# Patient Record
Sex: FEMALE | Race: WHITE | NOT HISPANIC OR LATINO | Employment: FULL TIME | URBAN - METROPOLITAN AREA
[De-identification: names, ages, dates, MRNs, and addresses within clinical notes are randomized per-mention and may not be internally consistent; named-entity substitution may affect disease eponyms.]

---

## 2019-06-24 ENCOUNTER — APPOINTMENT (OUTPATIENT)
Dept: GENERAL RADIOLOGY | Facility: HOSPITAL | Age: 34
End: 2019-06-24

## 2019-06-24 ENCOUNTER — HOSPITAL ENCOUNTER (EMERGENCY)
Facility: HOSPITAL | Age: 34
Discharge: HOME | End: 2019-06-24
Attending: EMERGENCY MEDICINE | Admitting: EMERGENCY MEDICINE

## 2019-06-24 VITALS
SYSTOLIC BLOOD PRESSURE: 125 MMHG | RESPIRATION RATE: 18 BRPM | WEIGHT: 140 LBS | TEMPERATURE: 97.8 F | OXYGEN SATURATION: 100 % | DIASTOLIC BLOOD PRESSURE: 75 MMHG | HEIGHT: 62 IN | BODY MASS INDEX: 25.76 KG/M2 | HEART RATE: 91 BPM

## 2019-06-24 DIAGNOSIS — M25.522 LEFT ELBOW PAIN: Primary | ICD-10-CM

## 2019-06-24 PROCEDURE — 73070 X-RAY EXAM OF ELBOW: CPT

## 2019-06-24 PROCEDURE — 99283 EMERGENCY DEPT VISIT LOW MDM: CPT

## 2019-06-24 RX ORDER — CEPHALEXIN 500 MG/1
500 CAPSULE ORAL 4 TIMES DAILY
Qty: 40 CAPSULE | Refills: 0 | OUTPATIENT
Start: 2019-06-24 | End: 2021-07-28

## 2019-06-24 RX ORDER — HYDROCODONE BITARTRATE AND ACETAMINOPHEN 5; 325 MG/1; MG/1
1 TABLET ORAL ONCE
Status: COMPLETED | OUTPATIENT
Start: 2019-06-24 | End: 2019-06-24

## 2019-06-24 RX ADMIN — HYDROCODONE BITARTRATE AND ACETAMINOPHEN 1 TABLET: 5; 325 TABLET ORAL at 02:13

## 2019-10-01 PROCEDURE — 99284 EMERGENCY DEPT VISIT MOD MDM: CPT

## 2019-10-02 ENCOUNTER — HOSPITAL ENCOUNTER (EMERGENCY)
Facility: HOSPITAL | Age: 34
Discharge: HOME | End: 2019-10-02
Attending: EMERGENCY MEDICINE | Admitting: EMERGENCY MEDICINE

## 2019-10-02 VITALS
SYSTOLIC BLOOD PRESSURE: 118 MMHG | OXYGEN SATURATION: 99 % | BODY MASS INDEX: 27.6 KG/M2 | DIASTOLIC BLOOD PRESSURE: 93 MMHG | TEMPERATURE: 97.8 F | RESPIRATION RATE: 18 BRPM | HEIGHT: 62 IN | WEIGHT: 150 LBS | HEART RATE: 103 BPM
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
## 2019-10-02 DIAGNOSIS — S61.239A HYPODERMIC NEEDLESTICK INJURY OF FINGER OF LEFT HAND: Primary | ICD-10-CM

## 2019-10-02 LAB
HBV SURFACE AG SERPL QL IA: NORMAL
HIV1+2 AB SER QL: NORMAL

## 2019-10-02 PROCEDURE — G0432 EIA HIV-1/HIV-2 SCREEN: HCPCS | Performed by: EMERGENCY MEDICINE

## 2019-10-02 PROCEDURE — 87340 HEPATITIS B SURFACE AG IA: CPT | Performed by: EMERGENCY MEDICINE

## 2019-10-02 RX ORDER — EMTRICITABINE AND TENOFOVIR DISOPROXIL FUMARATE 200; 300 MG/1; MG/1
1 TABLET, FILM COATED ORAL ONCE
Status: COMPLETED | OUTPATIENT
Start: 2019-10-02 | End: 2019-10-02

## 2019-10-02 RX ORDER — EMTRICITABINE AND TENOFOVIR DISOPROXIL FUMARATE 200; 300 MG/1; MG/1
1 TABLET, FILM COATED ORAL DAILY
Qty: 28 TABLET | Refills: 0 | Status: SHIPPED | OUTPATIENT
Start: 2019-10-02 | End: 2019-10-30

## 2019-10-02 RX ORDER — ONDANSETRON 4 MG/1
4 TABLET, FILM COATED ORAL EVERY 6 HOURS PRN
Qty: 8 TABLET | Refills: 0 | Status: SHIPPED | OUTPATIENT
Start: 2019-10-02 | End: 2019-11-22 | Stop reason: SDUPTHER

## 2019-10-02 RX ADMIN — EMTRICITABINE AND TENOFOVIR DISOPROXIL FUMARATE 1 TABLET: 200; 300 TABLET, FILM COATED ORAL at 04:36

## 2019-10-02 RX ADMIN — RALTEGRAVIR 400 MG: 400 TABLET, FILM COATED ORAL at 04:36

## 2019-10-02 NOTE — ED PROVIDER NOTES
Subjective   Ms. Adeola Patel is a 34 year old female who presents to the ED status post occupational exposure.  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  She was cleaning a sink at work this evening around 1800 when she accidentally struck her left third digit on the tip of a hypodermic needle that had been hidden in the drain.  She presents to the ED status post exposure for evaluation.  No acute symptoms at this time.  No nausea, vomiting, fevers, or chills.  No chest pain or shortness of breath.  No bowel or bladder changes.  Personal history of substance abuse and hepatitis C.  Negative hepatitis A vaccination.  Unknown hepatitis B vaccination.  Tetanus status is out-of-date (> 5 years ago).        History provided by:  Patient  Body Fluid Exposure   Type of exposure:  Needle stick  Exposure substance: unknown    Exposure location:  Finger  Finger exposure location:  L long finger  Time since exposure:  8 hours  Context:  Occupational (non-healthcare)  Tetanus immunization status:  Out of date  Known source: no        Review of Systems   Constitutional: Negative for chills and fever.   Respiratory: Negative for cough and shortness of breath.    Cardiovascular: Negative for chest pain.   Gastrointestinal: Negative for abdominal pain, blood in stool, constipation, diarrhea, nausea and vomiting.   Genitourinary: Negative.  Negative for dysuria.   Musculoskeletal: Negative for back pain and neck pain.   Skin:        + exposure   All other systems reviewed and are negative.      Past Medical History:   Diagnosis Date   • Changing skin lesion    • Dysuria    • Lumbar strain    • Substance abuse (CMS/HCC)    • Vaginitis        No Known Allergies    Past Surgical History:   Procedure Laterality Date   •  SECTION      13, 6/3/10   • LIVER BIOPSY         Family History   Problem Relation Age of Onset   • Hypothyroidism Mother    • Mental illness Mother    • Arthritis Father    •  Other Father         Kidney tumor    • Diabetes Paternal Aunt    • Colon cancer Maternal Grandfather    • Stroke Paternal Grandmother    • Cirrhosis Paternal Grandfather         hepatic   • Tracheal cancer Other         grandparent       Social History     Socioeconomic History   • Marital status: Single     Spouse name: Not on file   • Number of children: Not on file   • Years of education: Not on file   • Highest education level: Not on file   Tobacco Use   • Smoking status: Current Every Day Smoker     Packs/day: 1.00     Types: Cigarettes   • Smokeless tobacco: Never Used   • Tobacco comment: 1 pack every 3 days   Substance and Sexual Activity   • Alcohol use: No   • Drug use: No   • Sexual activity: Defer         Objective   Physical Exam   Constitutional: She is oriented to person, place, and time. She appears well-developed and well-nourished. No distress.   HENT:   Head: Normocephalic and atraumatic.   Eyes: Conjunctivae are normal. No scleral icterus.   Neck: Normal range of motion. Neck supple.   Cardiovascular: Normal rate, regular rhythm, normal heart sounds and intact distal pulses. Exam reveals no gallop and no friction rub.   No murmur heard.  Pulmonary/Chest: Effort normal and breath sounds normal. No respiratory distress. She has no wheezes. She has no rales.   Abdominal: Soft. Bowel sounds are normal. There is no tenderness. There is no guarding.   Musculoskeletal: Normal range of motion.   Neurological: She is alert and oriented to person, place, and time.   Skin: Skin is warm and dry. She is not diaphoretic. There is erythema.   Small amount of redness on the tip of the left third digit.   Psychiatric: She has a normal mood and affect. Her behavior is normal.   Nursing note and vitals reviewed.      Procedures         ED Course  ED Course as of Oct 02 1316   Wed Oct 02, 2019   0420 Patient has been previously diagnosed with hepatitis C which is managed by her primary care physician.  This was  "diagnosed initially 15 years ago.  [CP]      ED Course User Index  [CP] Bennett Winn DO     We thoroughly discussed the pros and cons of PEP treatment.  Pt strongly prefers treatment and will follow up with occupational medicine for further monitoring.      Recent Results (from the past 24 hour(s))   HIV-1 / O / 2 Ag / Antibody 4th Generation    Collection Time: 10/02/19  3:42 AM   Result Value Ref Range    HIV-1/ HIV-2 Non-Reactive Non-Reactive   Hepatitis B Surface Antigen    Collection Time: 10/02/19  4:56 AM   Result Value Ref Range    Hepatitis B Surface Ag Non-Reactive Non-Reactive     Note: In addition to lab results from this visit, the labs listed above may include labs taken at another facility or during a different encounter within the last 24 hours. Please correlate lab times with ED admission and discharge times for further clarification of the services performed during this visit.    No orders to display     Vitals:    10/02/19 0021 10/02/19 0200 10/02/19 0230 10/02/19 0441   BP: 130/63 101/58 102/71 118/93   Patient Position:    Sitting   Pulse: 107   103   Resp: 18   18   Temp: 97.8 °F (36.6 °C)   97.8 °F (36.6 °C)   TempSrc: Oral   Oral   SpO2: 96% 96% 96% 99%   Weight: 68 kg (150 lb)      Height: 157.5 cm (62\")        Medications   raltegravir (ISENTRESS) tablet 400 mg (400 mg Oral Given 10/2/19 0436)   emtricitabine-tenofovir (TRUVADA) 200-300 MG per tablet 1 tablet (1 tablet Oral Given 10/2/19 0436)     ECG/EMG Results (last 24 hours)     ** No results found for the last 24 hours. **        No orders to display                     MDM    Final diagnoses:   Hypodermic needlestick injury of finger of left hand       Documentation assistance provided by byron Nazario.  Information recorded by the scribe was done at my direction and has been verified and validated by me.     Eligio Nazario  10/02/19 8055       Eligio Nazario  10/02/19 0421       Bennett Winn DO  10/02/19 1317    "

## 2021-07-28 PROCEDURE — 87798 DETECT AGENT NOS DNA AMP: CPT | Performed by: NURSE PRACTITIONER

## 2021-07-28 PROCEDURE — 87661 TRICHOMONAS VAGINALIS AMPLIF: CPT | Performed by: NURSE PRACTITIONER

## 2021-07-28 PROCEDURE — 87529 HSV DNA AMP PROBE: CPT | Performed by: NURSE PRACTITIONER

## 2021-07-28 PROCEDURE — 87591 N.GONORRHOEAE DNA AMP PROB: CPT | Performed by: NURSE PRACTITIONER

## 2021-07-28 PROCEDURE — 87801 DETECT AGNT MULT DNA AMPLI: CPT | Performed by: NURSE PRACTITIONER

## 2021-07-28 PROCEDURE — 87491 CHLMYD TRACH DNA AMP PROBE: CPT | Performed by: NURSE PRACTITIONER

## 2022-08-07 ENCOUNTER — TELEPHONE (OUTPATIENT)
Dept: URGENT CARE | Facility: CLINIC | Age: 37
End: 2022-08-07

## 2022-08-07 PROCEDURE — U0003 INFECTIOUS AGENT DETECTION BY NUCLEIC ACID (DNA OR RNA); SEVERE ACUTE RESPIRATORY SYNDROME CORONAVIRUS 2 (SARS-COV-2) (CORONAVIRUS DISEASE [COVID-19]), AMPLIFIED PROBE TECHNIQUE, MAKING USE OF HIGH THROUGHPUT TECHNOLOGIES AS DESCRIBED BY CMS-2020-01-R: HCPCS | Performed by: PERSONAL EMERGENCY RESPONSE ATTENDANT

## 2022-08-10 ENCOUNTER — TELEPHONE (OUTPATIENT)
Dept: URGENT CARE | Facility: CLINIC | Age: 37
End: 2022-08-10

## 2023-12-26 ENCOUNTER — HOSPITAL ENCOUNTER (OUTPATIENT)
Dept: GENERAL RADIOLOGY | Facility: HOSPITAL | Age: 38
Discharge: HOME | End: 2023-12-26
Payer: MEDICAID

## 2023-12-26 ENCOUNTER — TRANSCRIBE ORDERS (OUTPATIENT)
Dept: ADMINISTRATIVE | Facility: HOSPITAL | Age: 38
End: 2023-12-26
Payer: MEDICAID

## 2023-12-26 DIAGNOSIS — Z91.09 ENVIRONMENTAL ALLERGIES: ICD-10-CM

## 2023-12-26 DIAGNOSIS — Z91.09 ENVIRONMENTAL ALLERGIES: Primary | ICD-10-CM

## 2023-12-26 PROCEDURE — 71046 X-RAY EXAM CHEST 2 VIEWS: CPT
